# Patient Record
Sex: FEMALE | Race: BLACK OR AFRICAN AMERICAN | NOT HISPANIC OR LATINO | Employment: UNEMPLOYED | URBAN - METROPOLITAN AREA
[De-identification: names, ages, dates, MRNs, and addresses within clinical notes are randomized per-mention and may not be internally consistent; named-entity substitution may affect disease eponyms.]

---

## 2023-10-08 ENCOUNTER — HOSPITAL ENCOUNTER (EMERGENCY)
Facility: HOSPITAL | Age: 33
Discharge: HOME | End: 2023-10-08

## 2023-10-08 VITALS
HEART RATE: 87 BPM | RESPIRATION RATE: 18 BRPM | HEIGHT: 69 IN | TEMPERATURE: 97.7 F | BODY MASS INDEX: 36.29 KG/M2 | OXYGEN SATURATION: 100 % | SYSTOLIC BLOOD PRESSURE: 142 MMHG | WEIGHT: 245 LBS | DIASTOLIC BLOOD PRESSURE: 79 MMHG

## 2023-10-08 DIAGNOSIS — K04.7 DENTAL INFECTION: Primary | ICD-10-CM

## 2023-10-08 LAB
PREGNANCY TEST URINE, POC: NEGATIVE
S PYO DNA THROAT QL NAA+PROBE: NOT DETECTED

## 2023-10-08 PROCEDURE — 99284 EMERGENCY DEPT VISIT MOD MDM: CPT | Performed by: NURSE PRACTITIONER

## 2023-10-08 PROCEDURE — 87651 STREP A DNA AMP PROBE: CPT | Performed by: NURSE PRACTITIONER

## 2023-10-08 PROCEDURE — 99283 EMERGENCY DEPT VISIT LOW MDM: CPT

## 2023-10-08 RX ORDER — PENICILLIN V POTASSIUM 500 MG/1
500 TABLET, FILM COATED ORAL 4 TIMES DAILY
Qty: 40 TABLET | Refills: 0 | Status: SHIPPED | OUTPATIENT
Start: 2023-10-08 | End: 2023-10-18

## 2023-10-08 ASSESSMENT — COLUMBIA-SUICIDE SEVERITY RATING SCALE - C-SSRS
6. HAVE YOU EVER DONE ANYTHING, STARTED TO DO ANYTHING, OR PREPARED TO DO ANYTHING TO END YOUR LIFE?: NO
2. HAVE YOU ACTUALLY HAD ANY THOUGHTS OF KILLING YOURSELF?: NO
1. IN THE PAST MONTH, HAVE YOU WISHED YOU WERE DEAD OR WISHED YOU COULD GO TO SLEEP AND NOT WAKE UP?: NO

## 2023-10-08 ASSESSMENT — LIFESTYLE VARIABLES
HAVE PEOPLE ANNOYED YOU BY CRITICIZING YOUR DRINKING: NO
HAVE YOU EVER FELT YOU SHOULD CUT DOWN ON YOUR DRINKING: NO
EVER FELT BAD OR GUILTY ABOUT YOUR DRINKING: NO
EVER HAD A DRINK FIRST THING IN THE MORNING TO STEADY YOUR NERVES TO GET RID OF A HANGOVER: NO

## 2023-10-08 ASSESSMENT — PAIN SCALES - GENERAL: PAINLEVEL_OUTOF10: 6

## 2023-10-08 ASSESSMENT — PAIN - FUNCTIONAL ASSESSMENT: PAIN_FUNCTIONAL_ASSESSMENT: 0-10

## 2023-10-08 NOTE — ED PROVIDER NOTES
HPI   Chief Complaint   Patient presents with    Dental Problem     Top right       This is a 34yo female with braces presenting to the ER for right lower dental pain, mainly along her gumline that radiates to her right lower jaw, and right ear. Pt also mentions that her throat feels sore on the right side and she has been experiencing pain on the front of her neck (also right side). Symptoms have been about 3 weeks but worse today. Initially she was seen in the ER and took a course of antibiotics with improvement of s/s. Unfortunately, she did not insurance at that time and was unable to follow up d/t lack of coverage. She has insurance now and an upcoming appointment with a dentist at the end of the month. No fever but chills. No injury. She has is unsure of pregnancy status, uses Mirena IUD but hasn't had her period in awhile. Pt took excedrin PTA. No other medical complaints. ROS otherwise negative.    PMH/PSH reviewed    General: No apparent distress, answers questions appropriately   HEENT: Normocephalic atraumatic, sclera white. Nose patent bilaterally. Tms unremarkable. No canal swelling or drainage. Braces appear intact, (both upper and lower). +tenderness along the right lower gumline near the central incisors and premolar area. No gingival swelling, fluctuance, or induration. Slight erythema. +calcified plaque present along the gumline. Mucous membranes pink and moist. No trismus or drooling. Right tonsil slightly swollen, mild erythema. No exudate. Uvula midline. No evidence of peritonsillar abscess. Pt speaking clear sentences without difficulty.   Neck: Trachea midline. No swelling or tenderness.+right anterior adenopathy. No evidence of Diony's angina.   Lungs: Clear to auscultation bilaterally. No use of accessory muscles.  Heart: Regular rate and rhythm. No obvious murmur.  Extremities: No clubbing, cyanosis or edema.   Neuro: No motor/sensory or focal deficits.   Psych: Normal affect. Pt calm and  cooperative.  Skin: No acute rash.    ED course:   UCG neg  Strep neg    Pt afebrile, VSS in NAD. Suspect pain associated with braces (may need adjustment) vs early dental infection. There is no evidence of dental or throat abscess today. Low concern for epiglottitis.      At this point, the patient will be discharged from the emergency department. She is in stable condition and in agreement with treatment plan. Pt states that she has naproxen at home she can take for pain.       Assessment/Plan:  #1 Dental pain, concern for early infection     Follow-up with the dental school tomorrow. PCNVK 500mg QID x 10 days. Naproxen prn. Return to the emergency department with any new concerns or if condition worsens.    Addendum: per nursing, pt eloped before receiving her discharge paperwork  *Please note that portions of this note may have been completed with a voice recognition program.  Efforts were made to edit the dictations but occasionally, words are mis-transcribed.                          [place] Coma Scale Score: 15                  Patient History   Past Medical History:   Diagnosis Date    36 weeks gestation of pregnancy 07/29/2021    36 weeks gestation of pregnancy    Encounter for initial prescription of contraceptive pills 02/04/2020    Encounter for initial prescription of contraceptive pills    Encounter for insertion of intrauterine contraceptive device 01/11/2019    Encounter for IUD insertion    Encounter for other general counseling and advice on contraception 01/11/2019    Encounter for counseling regarding contraception    Encounter for pregnancy test, result negative 01/11/2019    Negative pregnancy test    Encounter for removal of intrauterine contraceptive device 02/04/2020    Encounter for IUD removal    Encounter for screening for infections with a predominantly sexual mode of transmission 05/10/2016    Screening for STD (sexually transmitted disease)    Encounter for screening for malignant  neoplasm of cervix 2020    Screening for cervical cancer    Encounter for supervision of normal pregnancy, unspecified, unspecified trimester     Encounter for pregnancy related examination    Encounter for surveillance of implantable subdermal contraceptive 2017    Nexplanon removal    Missed  2020    Missed     Obesity, unspecified 10/23/2017    Class II obesity    Other problems related to lifestyle 05/10/2016    Other problems related to lifestyle    Other specified personal risk factors, not elsewhere classified     At risk of UTI    Other specified postprocedural states 2020    History of Papanicolaou smear    Personal history of other diseases of the respiratory system 2015    History of asthma    Personal history of other specified conditions 08/15/2018    History of seizure     No past surgical history on file.  No family history on file.  Social History     Tobacco Use    Smoking status: Not on file    Smokeless tobacco: Not on file   Substance Use Topics    Alcohol use: Not on file    Drug use: Not on file       Physical Exam   ED Triage Vitals [10/08/23 1133]   Temp Heart Rate Resp BP   36.5 °C (97.7 °F) 87 18 142/79      SpO2 Temp src Heart Rate Source Patient Position   100 % -- -- --      BP Location FiO2 (%)     -- --       Physical Exam    ED Course & MDM   Diagnoses as of 10/08/23 1501   Dental infection       Medical Decision Making      Procedure  Procedures     Barbie Tilley, APRN-CNP  10/08/23 1516

## 2023-10-08 NOTE — Clinical Note
Fidelia Stubbs was seen and treated in our emergency department on 10/8/2023.  She may return to work on 10/10/2023.       If you have any questions or concerns, please don't hesitate to call.      Barbie Tilley, APRN-CNP